# Patient Record
Sex: MALE | Race: OTHER | HISPANIC OR LATINO
[De-identification: names, ages, dates, MRNs, and addresses within clinical notes are randomized per-mention and may not be internally consistent; named-entity substitution may affect disease eponyms.]

---

## 2020-03-03 PROBLEM — Z00.00 ENCOUNTER FOR PREVENTIVE HEALTH EXAMINATION: Status: ACTIVE | Noted: 2020-03-03

## 2020-03-06 ENCOUNTER — APPOINTMENT (OUTPATIENT)
Dept: RADIATION ONCOLOGY | Facility: CLINIC | Age: 85
End: 2020-03-06
Payer: MEDICARE

## 2020-03-06 VITALS
HEART RATE: 78 BPM | RESPIRATION RATE: 18 BRPM | OXYGEN SATURATION: 98 % | SYSTOLIC BLOOD PRESSURE: 123 MMHG | WEIGHT: 183 LBS | DIASTOLIC BLOOD PRESSURE: 67 MMHG

## 2020-03-06 DIAGNOSIS — Z78.9 OTHER SPECIFIED HEALTH STATUS: ICD-10-CM

## 2020-03-06 DIAGNOSIS — I10 ESSENTIAL (PRIMARY) HYPERTENSION: ICD-10-CM

## 2020-03-06 PROCEDURE — 99204 OFFICE O/P NEW MOD 45 MIN: CPT | Mod: 25

## 2020-03-06 RX ORDER — AMLODIPINE BESYLATE 5 MG/1
5 TABLET ORAL
Refills: 0 | Status: ACTIVE | COMMUNITY

## 2020-03-06 RX ORDER — VALSARTAN AND HYDROCHLOROTHIAZIDE 320; 25 MG/1; MG/1
320-25 TABLET, FILM COATED ORAL
Refills: 0 | Status: ACTIVE | COMMUNITY

## 2020-03-06 NOTE — VITALS
[Maximal Pain Intensity: 4/10] : 4/10 [Least Pain Intensity: 0/10] : 0/10 [Pain Duration: ___] : Pain duration: [unfilled] [Pain Location: ___] : Pain Location: [unfilled] [90: Able to carry normal activity; minor signs or symptoms of disease.] : 90: Able to carry normal activity; minor signs or symptoms of disease.  [Date: ____________] : Patient's last distress assessment performed on [unfilled]. [0 - No Distress] : Distress Level: 0

## 2020-03-30 ENCOUNTER — OUTPATIENT (OUTPATIENT)
Dept: OUTPATIENT SERVICES | Facility: HOSPITAL | Age: 85
LOS: 1 days | End: 2020-03-30
Payer: MEDICARE

## 2020-03-30 DIAGNOSIS — C46.0 KAPOSI'S SARCOMA OF SKIN: ICD-10-CM

## 2020-03-30 PROCEDURE — 88321 CONSLTJ&REPRT SLD PREP ELSWR: CPT

## 2020-05-06 NOTE — REVIEW OF SYSTEMS
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

## 2020-05-06 NOTE — REASON FOR VISIT
[Other: ___] : [unfilled] [Routine On-Treatment] : a routine on-treatment visit for [[employer] ] : provided by [employer]   [Other: _____] : [unfilled] [FreeTextEntry1] : [id number] [FreeTextEntry2] : Carina [TWNoteComboBox1] : [place]

## 2020-05-06 NOTE — HISTORY OF PRESENT ILLNESS
[FreeTextEntry1] : 5/6/20 - OTV - Mr. Oh completed 800 cGy / 1200 cGy to the right knee.  He offers no complaints.\par ________________________________________________\par ONCOLOGIC HISTORY (3/6/20)\par Mr. Jono Oh is an 88 year old male referred by Dr. 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 \par \par As per Dr. Larios's note, he presented with skin lesions mainly on right posterior knee and right foot that appeared around Summer 2019. Initially the lesions were observed, but when they enlarged, patient was sent for biopsy. Biopsy was done with PCP Dr. Robert Vale at [facility] and it was positive for Kaposi sarcoma (path report not available at this time).\par \par PET/ CT done 2/26/2020 at [facility] showed the following: \par -Right shoulder uptake, possibly related to bursitis or joint effusion. \par -Severe multilevel osteoarthritis. \par -No FDG avid region suggestive of neoplastic disease. \par -Colonic diverticulosis. \par -Enlarged prostate. \par \par He most recently followed up with Dr. Larios on 2/28/2020, at which time he was referred here for consideration of RT. As per patient's daughter, Dr. Larios preferred to hold off on systemic therapy due to patient's age, but may consider it in the future if RT was not successful.  He has not had any treatment thus far for the Kaposi sarcoma. \par \par Today, he reports he feels generally well with the exception of soreness in the shoulders and lower back pain that started a week ago. He notes the back pain sometimes interferes with his sleep. He states the lesions to his right posterior knee and foot are asymptomatic; denies pain or pruritus. Denies fever, chills, SOB, CP, NVDC, fatigue, or any further complaints. \par

## 2020-05-06 NOTE — PHYSICAL EXAM
[Cervical Lymph Nodes Enlarged Anterior Bilaterally] : anterior cervical [Cervical Lymph Nodes Enlarged Posterior Bilaterally] : posterior cervical [Supraclavicular Lymph Nodes Enlarged Bilaterally] : supraclavicular [Normal] : no focal deficits [de-identified] : No changes over the right knee.  The KS lesions are less prominent.  [de-identified] : Along the right leg, beginning on the foot and extending to the lower thigh are scattered purpural macules and nodules.  The most prominent are clustered on the psterior-medial aspect of the right knee.  there is no edmea.

## 2020-05-06 NOTE — DISEASE MANAGEMENT
[Clinical] : TNM Stage: c [N/A] : Currently not applicable [FreeTextEntry4] : Nodular Kaposi's sarcoma [TTNM] : . [NTNM] : . [MTNM] : . [de-identified] : 800 cGy [de-identified] : 1200 cGy [de-identified] : right knee

## 2020-05-20 NOTE — DISEASE MANAGEMENT
[Clinical] : TNM Stage: c [N/A] : Currently not applicable [FreeTextEntry4] : Nodular Kaposi's sarcoma [TTNM] : . [NTNM] : . [MTNM] : .

## 2020-05-20 NOTE — REVIEW OF SYSTEMS
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

## 2020-05-20 NOTE — REASON FOR VISIT
[Consideration of Curative Therapy] : consideration of curative therapy for [Other: ___] : [unfilled] [Other: _____] : [unfilled] [[employer] ] : provided by [employer]   [FreeTextEntry1] : [id number] [FreeTextEntry2] : Carina [TWNoteComboBox1] : [place]

## 2020-05-20 NOTE — HISTORY OF PRESENT ILLNESS
[FreeTextEntry1] : Mr. Jono Oh is an 88 year old male referred by Dr. 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 \par \par As per Dr. Larios's note, he presented with skin lesions mainly on right posterior knee and right foot that appeared around Summer 2019. Initially the lesions were observed, but when they enlarged, patient was sent for biopsy. Biopsy was done with PCP Dr. Robert Vale at [facility] and it was positive for Kaposi sarcoma (path report not available at this time).\par \par PET/ CT done 2/26/2020 at [facility] showed the following: \par -Right shoulder uptake, possibly related to bursitis or joint effusion. \par -Severe multilevel osteoarthritis. \par -No FDG avid region suggestive of neoplastic disease. \par -Colonic diverticulosis. \par -Enlarged prostate. \par \par He most recently followed up with Dr. Larios on 2/28/2020, at which time he was referred here for consideration of RT. As per patient's daughter, Dr. Larios preferred to hold off on systemic therapy due to patient's age, but may consider it in the future if RT was not successful.  He has not had any treatment thus far for the Kaposi sarcoma. \par \par Today, he reports he feels generally well with the exception of soreness in the shoulders and lower back pain that started a week ago. He notes the back pain sometimes interferes with his sleep. He states the lesions to his right posterior knee and foot are asymptomatic; denies pain or pruritus. Denies fever, chills, SOB, CP, NVDC, fatigue, or any further complaints. \par

## 2020-05-20 NOTE — PHYSICAL EXAM
[Cervical Lymph Nodes Enlarged Posterior Bilaterally] : posterior cervical [Cervical Lymph Nodes Enlarged Anterior Bilaterally] : anterior cervical [Supraclavicular Lymph Nodes Enlarged Bilaterally] : supraclavicular [Normal] : oriented to person, place and time, the affect was normal, the mood was normal and not anxious [de-identified] : Along the right leg, beginning on the foot and extending to the lower thigh are scattered purpural macules and nodules.  The most prominent are clustered on the psterior-medial aspect of the right knee.  there is no edmea.

## 2020-06-09 NOTE — VITALS
[Maximal Pain Intensity: 4/10] : 4/10 [Least Pain Intensity: 0/10] : 0/10 [Pain Duration: ___] : Pain duration: [unfilled] [Pain Location: ___] : Pain Location: [unfilled] [90: Able to carry normal activity; minor signs or symptoms of disease.] : 90: Able to carry normal activity; minor signs or symptoms of disease.

## 2020-06-10 ENCOUNTER — APPOINTMENT (OUTPATIENT)
Dept: RADIATION ONCOLOGY | Facility: CLINIC | Age: 85
End: 2020-06-10
Payer: MEDICARE

## 2020-06-10 PROCEDURE — 99024 POSTOP FOLLOW-UP VISIT: CPT

## 2020-06-10 NOTE — PHYSICAL EXAM
[Cervical Lymph Nodes Enlarged Posterior Bilaterally] : posterior cervical [Supraclavicular Lymph Nodes Enlarged Bilaterally] : supraclavicular [Cervical Lymph Nodes Enlarged Anterior Bilaterally] : anterior cervical [Normal] : oriented to person, place and time, the affect was normal, the mood was normal and not anxious [de-identified] : No changes over the right knee.  The KS lesions are less prominent.  There is no induration of the skin.   [de-identified] : Along the right leg, beginning on the foot and extending to the lower thigh are scattered purpural macules and nodules.  The most prominent are clustered on the posterior-medial aspect of the right knee.  there is no edema.

## 2020-06-10 NOTE — REVIEW OF SYSTEMS
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 [FreeTextEntry9] : lower back pain, shoulder soreness

## 2020-06-10 NOTE — REASON FOR VISIT
[Post-Treatment Evaluation] : post-treatment evaluation for [Other: ___] : [unfilled] [Other: _____] : [unfilled] [[employer] ] : provided by [employer]   [FreeTextEntry1] : [id number] [FreeTextEntry2] : Carina [TWNoteComboBox1] : [place]

## 2020-06-10 NOTE — DISEASE MANAGEMENT
[Clinical] : TNM Stage: c [N/A] : Currently not applicable [FreeTextEntry4] : Nodular Kaposi's sarcoma [MTNM] : . [NTNM] : . [TTNM] : . [de-identified] : right knee

## 2020-06-10 NOTE — HISTORY OF PRESENT ILLNESS
[FreeTextEntry1] : Mr. Jono Oh has completed palliative radiation therapy to the RIGHT knee for a total of 1200 cGy over 3 fractions from 5/4/2020 to 5/8/2020 for Kaposi's sarcoma. He tolerated his radiation well and did not develop any grade 3 or higher acute toxicities as a result of treatment. \par \par 6/10/2020- PTE\par Mr. Oh returns for post treatment evaluation.  Overall is doing well, reporting that the discomfort he had at the right knee as resolved. He denies he had any issues with treatment. \par \par ________________________________________________\par ONCOLOGIC HISTORY (3/6/20)\par Mr. Jono Oh is an 88 year old male referred by Dr. 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 \par \par As per Dr. Larios's note, he presented with skin lesions mainly on right posterior knee and right foot that appeared around Summer 2019. Initially the lesions were observed, but when they enlarged, patient was sent for biopsy. Biopsy was done with PCP Dr. Robert Vale at [facility] and it was positive for Kaposi sarcoma (path report not available at this time).\par \par PET/ CT done 2/26/2020 at [facility] showed the following: \par -Right shoulder uptake, possibly related to bursitis or joint effusion. \par -Severe multilevel osteoarthritis. \par -No FDG avid region suggestive of neoplastic disease. \par -Colonic diverticulosis. \par -Enlarged prostate. \par \par He most recently followed up with Dr. Larios on 2/28/2020, at which time he was referred here for consideration of RT. As per patient's daughter, Dr. Larios preferred to hold off on systemic therapy due to patient's age, but may consider it in the future if RT was not successful.  He has not had any treatment thus far for the Kaposi sarcoma. \par \par Today, he reports he feels generally well with the exception of soreness in the shoulders and lower back pain that started a week ago. He notes the back pain sometimes interferes with his sleep. He states the lesions to his right posterior knee and foot are asymptomatic; denies pain or pruritus. Denies fever, chills, SOB, CP, NVDC, fatigue, or any further complaints. \par

## 2022-02-09 ENCOUNTER — APPOINTMENT (OUTPATIENT)
Dept: RADIATION ONCOLOGY | Facility: CLINIC | Age: 87
End: 2022-02-09
Payer: MEDICARE

## 2022-02-09 VITALS
DIASTOLIC BLOOD PRESSURE: 83 MMHG | TEMPERATURE: 98.3 F | SYSTOLIC BLOOD PRESSURE: 127 MMHG | HEIGHT: 65 IN | WEIGHT: 178 LBS | HEART RATE: 96 BPM | BODY MASS INDEX: 29.66 KG/M2 | OXYGEN SATURATION: 100 %

## 2022-02-09 PROCEDURE — 99213 OFFICE O/P EST LOW 20 MIN: CPT

## 2022-02-09 NOTE — PHYSICAL EXAM
[Cervical Lymph Nodes Enlarged Posterior Bilaterally] : posterior cervical [Cervical Lymph Nodes Enlarged Anterior Bilaterally] : anterior cervical [Supraclavicular Lymph Nodes Enlarged Bilaterally] : supraclavicular [Normal] : oriented to person, place and time, the affect was normal, the mood was normal and not anxious [de-identified] : No changes over the right knee.  The KS lesions are less prominent.  There is no induration of the skin.   [de-identified] : Again noted on the right leg are areas of purpural discoloration.  Along the lateral aspect of the right knee are several purpural nodules which are painful to her.  Also along the medial aspect of the right foot are several painful nodules, all consistent with KS.

## 2022-02-09 NOTE — DISEASE MANAGEMENT
[Clinical] : TNM Stage: c [N/A] : Currently not applicable [FreeTextEntry4] : Nodular Kaposi's sarcoma [TTNM] : . [NTNM] : . [MTNM] : . [de-identified] : right knee

## 2022-02-09 NOTE — HISTORY OF PRESENT ILLNESS
[FreeTextEntry1] :  REQUIRED, DAUGHTER:CLAU WARNER DOES NOT SPEAK ENGLISH EITHER.\par Mr. 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 He tolerated his radiation well and did not develop any grade 3 or higher acute toxicities as a result of treatment. \par Patient has now developed Kaposi sarcoma on RIGHT LATERAL KNEE AND RIGHT INNER FOOT.\par \par 02/08/2022- Follow Up\par  # [id number]. Patient is here accompanied by his daughter Clau. The patient is known to us from a previous treatment for KS in 2020.  Patient has spots denoting Kaposi Sarcoma to right lateral knee and right inner foot. Patient states he has mild pain on right foot. CT-Sim and MRI (ordered by PCP) scheduled for next week. \par \par 6/10/2020- PTE\par Mr. Warner returns for post treatment evaluation.  Overall is doing well, reporting that the discomfort he had at the right knee as resolved. He denies he had any issues with treatment. \par \par ________________________________________________\par ONCOLOGIC HISTORY (3/6/20)\par Mr. Jono Warner is an 88 year old male referred by Dr. 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 \par \par As per Dr. Larios's note, he presented with skin lesions mainly on right posterior knee and right foot that appeared around Summer 2019. Initially the lesions were observed, but when they enlarged, patient was sent for biopsy. Biopsy was done with PCP Dr. Robert Vale at [facility] and it was positive for Kaposi sarcoma (path report not available at this time).\par \par PET/ CT done 2/26/2020 at [facility] showed the following: \par -Right shoulder uptake, possibly related to bursitis or joint effusion. \par -Severe multilevel osteoarthritis. \par -No FDG avid region suggestive of neoplastic disease. \par -Colonic diverticulosis. \par -Enlarged prostate. \par \par He most recently followed up with Dr. Larios on 2/28/2020, at which time he was referred here for consideration of RT. As per patient's daughter, Dr. Larios preferred to hold off on systemic therapy due to patient's age, but may consider it in the future if RT was not successful.  He has not had any treatment thus far for the Kaposi sarcoma. \par \par Today, he reports he feels generally well with the exception of soreness in the shoulders and lower back pain that started a week ago. He notes the back pain sometimes interferes with his sleep. He states the lesions to his right posterior knee and foot are asymptomatic; denies pain or pruritus. Denies fever, chills, SOB, CP, NVDC, fatigue, or any further complaints. \par

## 2022-02-09 NOTE — REVIEW OF SYSTEMS
[Patient Intake Form Reviewed] : Patient intake form was reviewed [Negative] : Allergic/Immunologic [Edema Limbs: Grade 0] : Edema Limbs: Grade 0  [Fatigue: Grade 0] : Fatigue: Grade 0 [Localized Edema: Grade 0] : Localized Edema: Grade 0  [Neck Edema: Grade 0] : Neck Edema: Grade 0 [Cough: Grade 0] : Cough: Grade 0 [Dyspnea: Grade 0] : Dyspnea: Grade 0 [Hiccups: Grade 0] : Hiccups: Grade 0 [Hoarseness: Grade 0] : Hoarseness: Grade 0 [Alopecia: Grade 0] : Alopecia: Grade 0 [Pruritus: Grade 0] : Pruritus: Grade 0 [Skin Atrophy: Grade 0] : Skin Atrophy: Grade 0 [Skin Hyperpigmentation: Grade 0] : Skin Hyperpigmentation: Grade 0 [Skin Induration: Grade 0] : Skin Induration: Grade 0 [Dermatitis Radiation: Grade 0] : Dermatitis Radiation: Grade 0 [Fever] : no fever [Chills] : no chills [Night Sweats] : no night sweats [Fatigue] : no fatigue [Recent Change In Weight] : ~T no recent weight change [FreeTextEntry9] : lower back pain, shoulder soreness [de-identified] : Kaposi sarcoma- 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

## 2022-02-09 NOTE — VITALS
[Maximal Pain Intensity: 2/10] : 2/10 [Least Pain Intensity: 2/10] : 2/10 [NoTreatment Scheduled] : no treatment scheduled [90: Able to carry normal activity; minor signs or symptoms of disease.] : 90: Able to carry normal activity; minor signs or symptoms of disease.  [ECOG Performance Status: 1 - Restricted in physically strenuous activity but ambulatory and able to carry out work of a light or sedentary nature] : Performance Status: 1 - Restricted in physically strenuous activity but ambulatory and able to carry out work of a light or sedentary nature, e.g., light house work, office work

## 2022-02-16 ENCOUNTER — RESULT REVIEW (OUTPATIENT)
Age: 87
End: 2022-02-16

## 2022-02-16 ENCOUNTER — OUTPATIENT (OUTPATIENT)
Dept: OUTPATIENT SERVICES | Facility: HOSPITAL | Age: 87
LOS: 1 days | End: 2022-02-16
Payer: MEDICARE

## 2022-02-16 ENCOUNTER — APPOINTMENT (OUTPATIENT)
Dept: MRI IMAGING | Facility: HOSPITAL | Age: 87
End: 2022-02-16

## 2022-02-16 PROCEDURE — 73723 MRI JOINT LWR EXTR W/O&W/DYE: CPT | Mod: 26,RT

## 2022-02-17 ENCOUNTER — RESULT REVIEW (OUTPATIENT)
Age: 87
End: 2022-02-17

## 2022-02-17 ENCOUNTER — OUTPATIENT (OUTPATIENT)
Dept: OUTPATIENT SERVICES | Facility: HOSPITAL | Age: 87
LOS: 1 days | End: 2022-02-17
Payer: MEDICARE

## 2022-02-17 LAB — GLUCOSE BLDC GLUCOMTR-MCNC: 105 MG/DL — HIGH (ref 70–99)

## 2022-02-17 PROCEDURE — A9552: CPT

## 2022-02-17 PROCEDURE — 73723 MRI JOINT LWR EXTR W/O&W/DYE: CPT

## 2022-02-17 PROCEDURE — 78816 PET IMAGE W/CT FULL BODY: CPT | Mod: 26

## 2022-02-17 PROCEDURE — 78816 PET IMAGE W/CT FULL BODY: CPT

## 2022-02-17 PROCEDURE — 82962 GLUCOSE BLOOD TEST: CPT

## 2022-02-17 PROCEDURE — A9585: CPT

## 2022-04-04 ENCOUNTER — NON-APPOINTMENT (OUTPATIENT)
Age: 87
End: 2022-04-04

## 2022-04-04 VITALS
TEMPERATURE: 98 F | DIASTOLIC BLOOD PRESSURE: 77 MMHG | HEIGHT: 65 IN | RESPIRATION RATE: 16 BRPM | WEIGHT: 181 LBS | OXYGEN SATURATION: 98 % | HEART RATE: 86 BPM | BODY MASS INDEX: 30.16 KG/M2 | SYSTOLIC BLOOD PRESSURE: 129 MMHG

## 2022-05-09 ENCOUNTER — APPOINTMENT (OUTPATIENT)
Dept: RADIATION ONCOLOGY | Facility: CLINIC | Age: 87
End: 2022-05-09
Payer: MEDICARE

## 2022-05-09 VITALS
DIASTOLIC BLOOD PRESSURE: 74 MMHG | TEMPERATURE: 98.1 F | WEIGHT: 181 LBS | RESPIRATION RATE: 18 BRPM | HEART RATE: 101 BPM | HEIGHT: 65 IN | BODY MASS INDEX: 30.16 KG/M2 | SYSTOLIC BLOOD PRESSURE: 131 MMHG | OXYGEN SATURATION: 98 %

## 2022-05-09 DIAGNOSIS — C46.9 KAPOSI'S SARCOMA, UNSPECIFIED: ICD-10-CM

## 2022-05-09 PROCEDURE — 99024 POSTOP FOLLOW-UP VISIT: CPT

## 2022-05-09 NOTE — HISTORY OF PRESENT ILLNESS
[FreeTextEntry1] :  REQUIRED, DAUGHTER:CLAU WARNER DOES NOT SPEAK ENGLISH EITHER.\par \par \par 05/09/2022: PTE:   Mr. 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 He tolerated his radiation well and did not develop any grade 3 or higher acute toxicities as a result of treatment. \par \par Patient has now developed Kaposi sarcoma on RIGHT LATERAL KNEE AND RIGHT INNER FOOT. He has completed 3D conformal 3Fx or 1200 cGy each to right lower leg and right inner foot from 03/20/2022 to 04/4/2022.\par \par \par \par \par 04/04/2022: FIRST AND FINAL OTV:  # [id number].Patient is here accompanied by his daughter Clau.  has completed 3/3 Fx or 1200/1200 cGy to Kaposi's sarcoma of skin. No redness noted at treatment site.Encouraged to use Aquaphor to treatment site.  He notes a couple of nodules on the toes of the right foot, but he denies any pain or discomfort. \par \par _________________________________________________________________________________\par \par 02/08/2022- Follow Up\par  # [id number]. Patient is here accompanied by his daughter Clau. The patient is known to us from a previous treatment for KS in 2020.  Patient has spots denoting Kaposi Sarcoma to right lateral knee and right inner foot. Patient states he has mild pain on right foot. CT-Sim and MRI (ordered by PCP) scheduled for next week. \par \par 6/10/2020- PTE\par Mr. Warner returns for post treatment evaluation.  Overall is doing well, reporting that the discomfort he had at the right knee as resolved. He denies he had any issues with treatment. \par \par ________________________________________________\par ONCOLOGIC HISTORY (3/6/20)\par Mr. Jono Warner is an 88 year old male referred by Dr. 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 \par \par As per Dr. Larios's note, he presented with skin lesions mainly on right posterior knee and right foot that appeared around Summer 2019. Initially the lesions were observed, but when they enlarged, patient was sent for biopsy. Biopsy was done with PCP Dr. Robert Vale at [facility] and it was positive for Kaposi sarcoma (path report not available at this time).\par \par PET/ CT done 2/26/2020 at [facility] showed the following: \par -Right shoulder uptake, possibly related to bursitis or joint effusion. \par -Severe multilevel osteoarthritis. \par -No FDG avid region suggestive of neoplastic disease. \par -Colonic diverticulosis. \par -Enlarged prostate. \par \par He most recently followed up with Dr. Larios on 2/28/2020, at which time he was referred here for consideration of RT. As per patient's daughter, Dr. Larios preferred to hold off on systemic therapy due to patient's age, but may consider it in the future if RT was not successful.  He has not had any treatment thus far for the Kaposi sarcoma. \par \par Today, he reports he feels generally well with the exception of soreness in the shoulders and lower back pain that started a week ago. He notes the back pain sometimes interferes with his sleep. He states the lesions to his right posterior knee and foot are asymptomatic; denies pain or pruritus. Denies fever, chills, SOB, CP, NVDC, fatigue, or any further complaints. \par

## 2022-05-09 NOTE — REVIEW OF SYSTEMS
[Patient Intake Form Reviewed] : Patient intake form was reviewed [Negative] : Allergic/Immunologic [Edema Limbs: Grade 0] : Edema Limbs: Grade 0  [Fatigue: Grade 0] : Fatigue: Grade 0 [Localized Edema: Grade 0] : Localized Edema: Grade 0  [Neck Edema: Grade 0] : Neck Edema: Grade 0 [Cough: Grade 0] : Cough: Grade 0 [Dyspnea: Grade 0] : Dyspnea: Grade 0 [Hiccups: Grade 0] : Hiccups: Grade 0 [Hoarseness: Grade 0] : Hoarseness: Grade 0 [Alopecia: Grade 0] : Alopecia: Grade 0 [Pruritus: Grade 0] : Pruritus: Grade 0 [Skin Hyperpigmentation: Grade 0] : Skin Hyperpigmentation: Grade 0 [Skin Atrophy: Grade 0] : Skin Atrophy: Grade 0 [Skin Induration: Grade 0] : Skin Induration: Grade 0 [Dermatitis Radiation: Grade 0] : Dermatitis Radiation: Grade 0 [Fever] : no fever [Chills] : no chills [Night Sweats] : no night sweats [Fatigue] : no fatigue [Recent Change In Weight] : ~T no recent weight change [FreeTextEntry9] : lower back pain, shoulder soreness [de-identified] : Kaposi sarcoma- 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

## 2022-05-09 NOTE — PHYSICAL EXAM
[Cervical Lymph Nodes Enlarged Posterior Bilaterally] : posterior cervical [Cervical Lymph Nodes Enlarged Anterior Bilaterally] : anterior cervical [Supraclavicular Lymph Nodes Enlarged Bilaterally] : supraclavicular [Normal] : oriented to person, place and time, the affect was normal, the mood was normal and not anxious [de-identified] :  The KS lesions continue to shrink.   [de-identified] : Again noted on the right leg are areas of purpural discoloration.  Along the lateral aspect of the right knee are several purpural nodules which are painful to her.  Also along the medial aspect of the right foot are several painful nodules, all consistent with KS.

## 2022-05-09 NOTE — PHYSICAL EXAM
[Cervical Lymph Nodes Enlarged Posterior Bilaterally] : posterior cervical [Cervical Lymph Nodes Enlarged Anterior Bilaterally] : anterior cervical [Supraclavicular Lymph Nodes Enlarged Bilaterally] : supraclavicular [Normal] : no joint swelling, no clubbing or cyanosis of the fingernails and muscle strength and tone were normal [de-identified] : No changes over the right knee.  The KS lesions are less prominent.  There is no induration of the skin.   [de-identified] : The treated leg looks much clearer, with few KS lesions noted and most are flat, ie not as yet nodular.

## 2022-05-09 NOTE — DISEASE MANAGEMENT
[Clinical] : TNM Stage: c [N/A] : Currently not applicable [FreeTextEntry4] : Nodular Kaposi's sarcoma [TTNM] : . [NTNM] : . [MTNM] : . [de-identified] : 1200 [de-identified] : 1200 [de-identified] : Right lower leg and Right inner foot

## 2022-05-09 NOTE — HISTORY OF PRESENT ILLNESS
[FreeTextEntry1] :  REQUIRED, DAUGHTER:CLAU WARNER DOES NOT SPEAK ENGLISH EITHER.\par Mr. 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 He tolerated his radiation well and did not develop any grade 3 or higher acute toxicities as a result of treatment. 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  has completed 3/3 Fx or 1200/1200 cGy to Kaposi's sarcoma of skin. No redness noted at treatment site.Encouraged to use Aquaphor to treatment site.  He notes a couple of nodules on the toes of the right foot, but he denies any pain or discomfort. \par \par _________________________________________________________________________________\par \par 02/08/2022- Follow Up\par  # [id number]. Patient is here accompanied by his daughter Clau. The patient is known to us from a previous treatment for KS in 2020.  Patient has spots denoting Kaposi Sarcoma to right lateral knee and right inner foot. Patient states he has mild pain on right foot. CT-Sim and MRI (ordered by PCP) scheduled for next week. \par \par 6/10/2020- PTE\par Mr. Warner returns for post treatment evaluation.  Overall is doing well, reporting that the discomfort he had at the right knee as resolved. He denies he had any issues with treatment. \par \par ________________________________________________\par ONCOLOGIC HISTORY (3/6/20)\par Mr. Jono Warner is an 88 year old male referred by Dr. 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 \par \par As per Dr. Larios's note, he presented with skin lesions mainly on right posterior knee and right foot that appeared around Summer 2019. Initially the lesions were observed, but when they enlarged, patient was sent for biopsy. Biopsy was done with PCP Dr. Robret Vale at [facility] and it was positive for Kaposi sarcoma (path report not available at this time).\par \par PET/ CT done 2/26/2020 at [facility] showed the following: \par -Right shoulder uptake, possibly related to bursitis or joint effusion. \par -Severe multilevel osteoarthritis. \par -No FDG avid region suggestive of neoplastic disease. \par -Colonic diverticulosis. \par -Enlarged prostate. \par \par He most recently followed up with Dr. Larios on 2/28/2020, at which time he was referred here for consideration of RT. As per patient's daughter, Dr. Larios preferred to hold off on systemic therapy due to patient's age, but may consider it in the future if RT was not successful.  He has not had any treatment thus far for the Kaposi sarcoma. \par \par Today, he reports he feels generally well with the exception of soreness in the shoulders and lower back pain that started a week ago. He notes the back pain sometimes interferes with his sleep. He states the lesions to his right posterior knee and foot are asymptomatic; denies pain or pruritus. Denies fever, chills, SOB, CP, NVDC, fatigue, or any further complaints. \par

## 2022-05-09 NOTE — DISEASE MANAGEMENT
[Clinical] : TNM Stage: c [N/A] : Currently not applicable [FreeTextEntry4] : Nodular Kaposi's sarcoma [TTNM] : . [NTNM] : . [MTNM] : . [de-identified] : 1200 [de-identified] : 1200 [de-identified] : Right lower leg and Right inner foot

## 2022-05-09 NOTE — REVIEW OF SYSTEMS
[Edema Limbs: Grade 0] : Edema Limbs: Grade 0  [Localized Edema: Grade 0] : Localized Edema: Grade 0  [Fatigue: Grade 0] : Fatigue: Grade 0 [Neck Edema: Grade 0] : Neck Edema: Grade 0 [Cough: Grade 0] : Cough: Grade 0 [Dyspnea: Grade 0] : Dyspnea: Grade 0 [Hiccups: Grade 0] : Hiccups: Grade 0 [Hoarseness: Grade 0] : Hoarseness: Grade 0 [Alopecia: Grade 0] : Alopecia: Grade 0 [Pruritus: Grade 0] : Pruritus: Grade 0 [Skin Atrophy: Grade 0] : Skin Atrophy: Grade 0 [Skin Hyperpigmentation: Grade 0] : Skin Hyperpigmentation: Grade 0 [Skin Induration: Grade 0] : Skin Induration: Grade 0 [Dermatitis Radiation: Grade 0] : Dermatitis Radiation: Grade 0 [Patient Intake Form Reviewed] : Patient intake form was reviewed [Negative] : Allergic/Immunologic [Fever] : no fever [Chills] : no chills [Night Sweats] : no night sweats [Fatigue] : no fatigue [Recent Change In Weight] : ~T no recent weight change [FreeTextEntry9] : lower back pain, shoulder soreness [de-identified] : Kaposi sarcoma- 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

## 2022-11-22 ENCOUNTER — APPOINTMENT (OUTPATIENT)
Dept: RADIATION ONCOLOGY | Facility: CLINIC | Age: 87
End: 2022-11-22
Payer: MEDICARE

## 2022-11-23 ENCOUNTER — APPOINTMENT (OUTPATIENT)
Dept: RADIATION ONCOLOGY | Facility: CLINIC | Age: 87
End: 2022-11-23
Payer: MEDICARE

## 2022-11-23 VITALS
HEIGHT: 65 IN | WEIGHT: 180 LBS | SYSTOLIC BLOOD PRESSURE: 113 MMHG | RESPIRATION RATE: 16 BRPM | HEART RATE: 81 BPM | BODY MASS INDEX: 29.99 KG/M2 | OXYGEN SATURATION: 98 % | DIASTOLIC BLOOD PRESSURE: 68 MMHG | TEMPERATURE: 98.2 F

## 2022-11-23 PROCEDURE — 99215 OFFICE O/P EST HI 40 MIN: CPT

## 2022-11-28 NOTE — HISTORY OF PRESENT ILLNESS
[FreeTextEntry1] :  REQUIRED, DAUGHTER:CLAU OH DOES NOT SPEAK ENGLISH EITHER.\par \par \par 11/23/22 RPA:\par Pt presents for follow-up.  # [id number]. Patient is here with his daughter. Skin over radiation treatment area on lateral aspect of knee and right inner foot  is clean, dry and intact. There are 3 hyperpigmented area to lateral aspect of knee at previously treated area. Also, there are 3 new lesions noted on medial aspect of right heel.  Denies any pain at treatment site, fatigue or difficulty moving his knee.On systemic therapy with Dr. Cottrell. Next appointment with  is on 12/01/2022. If the lesion on the heel turns out positive for Kaposi sarcoma, patient will be treated. patient and daughter made aware of the plan and consent signed.\par \par 05/09/2022: PTE \par Mr. 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 He tolerated his radiation well and did not develop any grade 3 or higher acute toxicities as a result of treatment. 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 He has completed 3D conformal 3Fx or 1200 cGy each to right lower leg and right inner foot from 03/20/2022 to 04/4/2022.\par \par \par 04/04/2022: FINAL OTV  # [id number]\par Patient is here accompanied by his daughter Clau.  has completed 3/3 Fx or 1200/1200 cGy to Kaposi's sarcoma of skin. No redness noted at treatment site.Encouraged to use Aquaphor to treatment site.  He notes a couple of nodules on the toes of the right foot, but he denies any pain or discomfort. \par \par \par 02/08/2022: Follow Up\par  # [id number]. Patient is here accompanied by his daughter Clau. The patient is known to us from a previous treatment for KS in 2020.  Patient has spots denoting Kaposi Sarcoma to right lateral knee and right inner foot. Patient states he has mild pain on right foot. CT-Sim and MRI (ordered by PCP) scheduled for next week. \par \par \par 6/10/2020: PTE\par Mr. Oh returns for post treatment evaluation.  Overall is doing well, reporting that the discomfort he had at the right knee as resolved. He denies he had any issues with treatment. \par \par ________________________________________________\par ONCOLOGIC HISTORY (3/6/20)\par Mr. Jono Oh is an 88 year old male referred by Dr. 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 \par \par As per Dr. Larios's note, he presented with skin lesions mainly on right posterior knee and right foot that appeared around Summer 2019. Initially the lesions were observed, but when they enlarged, patient was sent for biopsy. Biopsy was done with PCP Dr. Robert Vale at [facility] and it was positive for Kaposi sarcoma (path report not available at this time).\par \par PET/ CT done 2/26/2020 at [facility] showed the following: \par -Right shoulder uptake, possibly related to bursitis or joint effusion. \par -Severe multilevel osteoarthritis. \par -No FDG avid region suggestive of neoplastic disease. \par -Colonic diverticulosis. \par -Enlarged prostate. \par \par He most recently followed up with Dr. Larios on 2/28/2020, at which time he was referred here for consideration of RT. As per patient's daughter, Dr. Larios preferred to hold off on systemic therapy due to patient's age, but may consider it in the future if RT was not successful.  He has not had any treatment thus far for the Kaposi sarcoma. \par \par Today, he reports he feels generally well with the exception of soreness in the shoulders and lower back pain that started a week ago. He notes the back pain sometimes interferes with his sleep. He states the lesions to his right posterior knee and foot are asymptomatic; denies pain or pruritus. Denies fever, chills, SOB, CP, NVDC, fatigue, or any further complaints. \par

## 2022-11-28 NOTE — ASSESSMENT
[Regional recurrence] : Regional recurrence [FreeTextEntry1] : suspected regional recurrence in the right heel

## 2022-11-28 NOTE — REVIEW OF SYSTEMS
[Joint Pain] : joint pain [Negative] : Psychiatric [Edema Limbs: Grade 0] : Edema Limbs: Grade 0  [Fatigue: Grade 0] : Fatigue: Grade 0 [Localized Edema: Grade 0] : Localized Edema: Grade 0  [Neck Edema: Grade 0] : Neck Edema: Grade 0 [Peripheral Motor Neuropathy: Grade 0] : Peripheral Motor Neuropathy: Grade 0 [Peripheral Sensory Neuropathy: Grade 0] : Peripheral Sensory Neuropathy: Grade 0 [Alopecia: Grade 0] : Alopecia: Grade 0 [Pruritus: Grade 0] : Pruritus: Grade 0 [Skin Atrophy: Grade 0] : Skin Atrophy: Grade 0 [Skin Induration: Grade 0] : Skin Induration: Grade 0 [Dermatitis Radiation: Grade 0] : Dermatitis Radiation: Grade 0 [Skin Hyperpigmentation: Grade 1 - Hyperpigmentation covering <10% BSA; no psychosocial impact] : Skin Hyperpigmentation: Grade 1 - Hyperpigmentation covering <10% BSA; no psychosocial impact [Muscle Pain] : no muscle pain [Muscle Weakness] : no muscle weakness [FreeTextEntry9] : Osteoarthritis on right knee

## 2022-11-28 NOTE — DISEASE MANAGEMENT
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 [NTNM] : . [MTNM] : . [de-identified] : 1200 [de-identified] : 1200 [de-identified] : Right lower leg and Right inner foot

## 2022-11-28 NOTE — PHYSICAL EXAM
[Exaggerated Use Of Accessory Muscles For Inspiration] : no accessory muscle use [Musculoskeletal - Swelling] : no joint swelling [Range of Motion to Joints] : range of motion to joints [Motor Tone] : muscle strength and tone were normal [Skin Color & Pigmentation] : normal skin color and pigmentation [] : no rash [Skin Lesions] : no skin lesions [Normal] : no focal deficits [Oriented To Time, Place, And Person] : oriented to person, place, and time [de-identified] : patient has three round, dark red, firm, painless, raised lumps on the medial side of his right heel which are suspicious for recurrent Kaposi sarcoma

## 2022-12-05 ENCOUNTER — FORM ENCOUNTER (OUTPATIENT)
Age: 87
End: 2022-12-05

## 2022-12-15 ENCOUNTER — NON-APPOINTMENT (OUTPATIENT)
Age: 87
End: 2022-12-15

## 2022-12-15 NOTE — REASON FOR VISIT
[Routine On-Treatment] : a routine on-treatment visit for [Other: ___] : [unfilled] [Other: _____] : [unfilled] [Home] : at home, [unfilled] , at the time of the visit. [Medical Office: ([facility])___] : at the medical office located in  [Patient Declined  Services] : - None: Patient declined  services [Verbal consent obtained from patient] : the patient, [unfilled] [FreeTextEntry3] : Pt ok communicating with staff fluent in Telugu  [TWNoteComboBox1] : [place]ian

## 2022-12-15 NOTE — HISTORY OF PRESENT ILLNESS
[FreeTextEntry1] :  REQUIRED, DAUGHTER:CLAU OH DOES NOT SPEAK ENGLISH EITHER.\par \par 12/15/22 OTV\par Pt presents for OTV over telephone. Completed 800cGy/1200cGy to R medial heel. Notes feeling well overall. No complaints. PTE date confirmed with pt and understanding received. \par \par 11/23/22 RPA:\par Pt presents for follow-up.  # [id number]. Patient is here with his daughter. Skin over radiation treatment area on lateral aspect of knee and right inner foot  is clean, dry and intact. There are 3 hyperpigmented area to lateral aspect of knee at previously treated area. Also, there are 3 new lesions noted on medial aspect of right heel.  Denies any pain at treatment site, fatigue or difficulty moving his knee.On systemic therapy with Dr. Cottrell. Next appointment with  is on 12/01/2022. If the lesion on the heel turns out positive for Kaposi sarcoma, patient will be treated. patient and daughter made aware of the plan and consent signed.\par \par 05/09/2022: PTE \par Mr. 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 He tolerated his radiation well and did not develop any grade 3 or higher acute toxicities as a result of treatment. 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 He has completed 3D conformal 3Fx or 1200 cGy each to right lower leg and right inner foot from 03/20/2022 to 04/4/2022.\par \par \par 04/04/2022: FINAL OTV  # [id number]\par Patient is here accompanied by his daughter Clau.  has completed 3/3 Fx or 1200/1200 cGy to Kaposi's sarcoma of skin. No redness noted at treatment site.Encouraged to use Aquaphor to treatment site.  He notes a couple of nodules on the toes of the right foot, but he denies any pain or discomfort. \par \par \par 02/08/2022: Follow Up\par  # [id number]. Patient is here accompanied by his daughter Clau. The patient is known to us from a previous treatment for KS in 2020.  Patient has spots denoting Kaposi Sarcoma to right lateral knee and right inner foot. Patient states he has mild pain on right foot. CT-Sim and MRI (ordered by PCP) scheduled for next week. \par \par \par 6/10/2020: PTE\par Mr. Oh returns for post treatment evaluation.  Overall is doing well, reporting that the discomfort he had at the right knee as resolved. He denies he had any issues with treatment. \par \par ________________________________________________\par ONCOLOGIC HISTORY (3/6/20)\par Mr. Jono Oh is an 88 year old male referred by Dr. 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 \par \par As per Dr. Larios's note, he presented with skin lesions mainly on right posterior knee and right foot that appeared around Summer 2019. Initially the lesions were observed, but when they enlarged, patient was sent for biopsy. Biopsy was done with PCP Dr. Robert Vale at [facility] and it was positive for Kaposi sarcoma (path report not available at this time).\par \par PET/ CT done 2/26/2020 at [facility] showed the following: \par -Right shoulder uptake, possibly related to bursitis or joint effusion. \par -Severe multilevel osteoarthritis. \par -No FDG avid region suggestive of neoplastic disease. \par -Colonic diverticulosis. \par -Enlarged prostate. \par \par He most recently followed up with Dr. Larios on 2/28/2020, at which time he was referred here for consideration of RT. As per patient's daughter, Dr. Larios preferred to hold off on systemic therapy due to patient's age, but may consider it in the future if RT was not successful.  He has not had any treatment thus far for the Kaposi sarcoma. \par \par Today, he reports he feels generally well with the exception of soreness in the shoulders and lower back pain that started a week ago. He notes the back pain sometimes interferes with his sleep. He states the lesions to his right posterior knee and foot are asymptomatic; denies pain or pruritus. Denies fever, chills, SOB, CP, NVDC, fatigue, or any further complaints. \par

## 2022-12-15 NOTE — DISEASE MANAGEMENT
[Clinical] : TNM Stage: c [N/A] : Currently not applicable [FreeTextEntry4] : Nodular Kaposi's sarcoma [TTNM] : . [NTNM] : . [MTNM] : . [de-identified] : 469 [de-identified] : 1200 [de-identified] : Right medial heel

## 2022-12-15 NOTE — REVIEW OF SYSTEMS
[Fatigue: Grade 0] : Fatigue: Grade 0 [Alopecia: Grade 0] : Alopecia: Grade 0 [Pruritus: Grade 0] : Pruritus: Grade 0 [Skin Atrophy: Grade 0] : Skin Atrophy: Grade 0 [Skin Hyperpigmentation: Grade 0] : Skin Hyperpigmentation: Grade 0 [Skin Induration: Grade 0] : Skin Induration: Grade 0 [Dermatitis Radiation: Grade 0] : Dermatitis Radiation: Grade 0

## 2023-01-27 ENCOUNTER — APPOINTMENT (OUTPATIENT)
Dept: RADIATION ONCOLOGY | Facility: CLINIC | Age: 88
End: 2023-01-27
Payer: MEDICARE

## 2023-01-27 VITALS
SYSTOLIC BLOOD PRESSURE: 113 MMHG | BODY MASS INDEX: 28.56 KG/M2 | HEART RATE: 79 BPM | WEIGHT: 171.6 LBS | RESPIRATION RATE: 16 BRPM | TEMPERATURE: 99.1 F | DIASTOLIC BLOOD PRESSURE: 61 MMHG | OXYGEN SATURATION: 96 %

## 2023-01-27 PROCEDURE — 99024 POSTOP FOLLOW-UP VISIT: CPT

## 2023-01-27 NOTE — REVIEW OF SYSTEMS
[Joint Pain] : joint pain [Negative] : Genitourinary [Alopecia: Grade 0] : Alopecia: Grade 0 [Pruritus: Grade 0] : Pruritus: Grade 0 [Skin Atrophy: Grade 0] : Skin Atrophy: Grade 0 [Skin Hyperpigmentation: Grade 0] : Skin Hyperpigmentation: Grade 0 [Skin Induration: Grade 0] : Skin Induration: Grade 0 [Dermatitis Radiation: Grade 0] : Dermatitis Radiation: Grade 0 [FreeTextEntry9] : R knee  [de-identified] : dryness at R heel

## 2023-01-27 NOTE — DISEASE MANAGEMENT
[Clinical] : TNM Stage: c [N/A] : Currently not applicable [FreeTextEntry4] : Nodular Kaposi's sarcoma [TTNM] : . [NTNM] : . [MTNM] : . [de-identified] : 1200 [de-identified] : 1200 [de-identified] : Right medial heel

## 2023-01-27 NOTE — HISTORY OF PRESENT ILLNESS
[FreeTextEntry1] :  REQUIRED, DAUGHTER:PAO OH DOES NOT SPEAK ENGLISH EITHER.\par \par 1/27/23 PTE:\par : [id number]. Pt presents for PTE. Completed 1200cGy/3fx to R medial heel on 12/19/22. Today he reports feeling well overall. Denies presence of symptoms at the treatment site. On assessment, the R heel appears dry without inflammation. There are 2 - 3 darkened lesions noted on the R thigh and calf. Pt complaining of arthritic pain in R knee, but otherwise performing ADL as tolerated.\par \par 12/15/22 FINAL OTV\par Pt presents for OTV over telephone. Completed 800cGy/1200cGy to R medial heel. Notes feeling well overall. No complaints. PTE date confirmed with pt and understanding received. \par \par 11/23/22 RPA:\par Pt presents for follow-up.  # [id number]. Patient is here with his daughter. Skin over radiation treatment area on lateral aspect of knee and right inner foot  is clean, dry and intact. There are 3 hyperpigmented area to lateral aspect of knee at previously treated area. Also, there are 3 new lesions noted on medial aspect of right heel.  Denies any pain at treatment site, fatigue or difficulty moving his knee.On systemic therapy with Dr. Cottrell. Next appointment with  is on 12/01/2022. If the lesion on the heel turns out positive for Kaposi sarcoma, patient will be treated. patient and daughter made aware of the plan and consent signed.\par \par 05/09/2022: PTE \par Mr. 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 He tolerated his radiation well and did not develop any grade 3 or higher acute toxicities as a result of treatment. 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 He has completed 3D conformal 3Fx or 1200 cGy each to right lower leg and right inner foot from 03/20/2022 to 04/4/2022.\par \par \par 04/04/2022: FINAL OTV  # [id number]\par Patient is here accompanied by his daughter Pao.  has completed 3/3 Fx or 1200/1200 cGy to Kaposi's sarcoma of skin. No redness noted at treatment site.Encouraged to use Aquaphor to treatment site.  He notes a couple of nodules on the toes of the right foot, but he denies any pain or discomfort. \par \par \par 02/08/2022: Follow Up\par  # [id number]. Patient is here accompanied by his daughter Pao. The patient is known to us from a previous treatment for KS in 2020.  Patient has spots denoting Kaposi Sarcoma to right lateral knee and right inner foot. Patient states he has mild pain on right foot. CT-Sim and MRI (ordered by PCP) scheduled for next week. \par \par \par 6/10/2020: PTE\par Mr. hO returns for post treatment evaluation.  Overall is doing well, reporting that the discomfort he had at the right knee as resolved. He denies he had any issues with treatment. \par \par ________________________________________________\par ONCOLOGIC HISTORY (3/6/20)\par Mr. Jono Oh is an 88 year old male referred by Dr. 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 \par \par As per Dr. Larios's note, he presented with skin lesions mainly on right posterior knee and right foot that appeared around Summer 2019. Initially the lesions were observed, but when they enlarged, patient was sent for biopsy. Biopsy was done with PCP Dr. Robert Vale at [facility] and it was positive for Kaposi sarcoma (path report not available at this time).\par \par PET/ CT done 2/26/2020 at [facility] showed the following: \par -Right shoulder uptake, possibly related to bursitis or joint effusion. \par -Severe multilevel osteoarthritis. \par -No FDG avid region suggestive of neoplastic disease. \par -Colonic diverticulosis. \par -Enlarged prostate. \par \par He most recently followed up with Dr. Larios on 2/28/2020, at which time he was referred here for consideration of RT. As per patient's daughter, Dr. Larios preferred to hold off on systemic therapy due to patient's age, but may consider it in the future if RT was not successful.  He has not had any treatment thus far for the Kaposi sarcoma. \par \par Today, he reports he feels generally well with the exception of soreness in the shoulders and lower back pain that started a week ago. He notes the back pain sometimes interferes with his sleep. He states the lesions to his right posterior knee and foot are asymptomatic; denies pain or pruritus. Denies fever, chills, SOB, CP, NVDC, fatigue, or any further complaints. \par

## 2023-01-27 NOTE — PHYSICAL EXAM
[Normal] : the sclera and conjunctiva were normal, pupils were equal in size, round, reactive to light and extraocular movements were intact. [FreeTextEntry1] : patient has new lesions on his right middle this - large, flat lesion, inner right thigh - two lesions adjacent to each other, right inner calf - new lesion below previous tx field. All painless

## 2023-03-02 NOTE — VITALS
[80: Normal activity with effort; some signs or symptoms of disease.] : 80: Normal activity with effort; some signs or symptoms of disease.  [Negative] : Heme/Lymph

## 2023-03-30 ENCOUNTER — APPOINTMENT (OUTPATIENT)
Dept: NUCLEAR MEDICINE | Facility: HOSPITAL | Age: 88
End: 2023-03-30

## 2023-03-30 ENCOUNTER — OUTPATIENT (OUTPATIENT)
Dept: OUTPATIENT SERVICES | Facility: HOSPITAL | Age: 88
LOS: 1 days | End: 2023-03-30
Payer: MEDICARE

## 2023-03-30 LAB — GLUCOSE BLDC GLUCOMTR-MCNC: 100 MG/DL — HIGH (ref 70–99)

## 2023-03-30 PROCEDURE — 78816 PET IMAGE W/CT FULL BODY: CPT

## 2023-03-30 PROCEDURE — 82962 GLUCOSE BLOOD TEST: CPT

## 2023-03-30 PROCEDURE — 78816 PET IMAGE W/CT FULL BODY: CPT | Mod: 26

## 2023-03-30 PROCEDURE — A9552: CPT

## 2023-09-08 ENCOUNTER — APPOINTMENT (OUTPATIENT)
Dept: RADIATION ONCOLOGY | Facility: CLINIC | Age: 88
End: 2023-09-08
Payer: MEDICARE

## 2023-09-08 VITALS
DIASTOLIC BLOOD PRESSURE: 73 MMHG | RESPIRATION RATE: 16 BRPM | SYSTOLIC BLOOD PRESSURE: 113 MMHG | OXYGEN SATURATION: 98 % | TEMPERATURE: 97.8 F | BODY MASS INDEX: 27.49 KG/M2 | WEIGHT: 165 LBS | HEART RATE: 90 BPM | HEIGHT: 65 IN

## 2023-09-08 PROCEDURE — 99215 OFFICE O/P EST HI 40 MIN: CPT

## 2023-09-08 NOTE — VITALS
[Maximal Pain Intensity: 4/10] : 4/10 [NoTreatment Scheduled] : no treatment scheduled [90: Able to carry normal activity; minor signs or symptoms of disease.] : 90: Able to carry normal activity; minor signs or symptoms of disease.  [ECOG Performance Status: 1 - Restricted in physically strenuous activity but ambulatory and able to carry out work of a light or sedentary nature] : Performance Status: 1 - Restricted in physically strenuous activity but ambulatory and able to carry out work of a light or sedentary nature, e.g., light house work, office work

## 2023-09-08 NOTE — HISTORY OF PRESENT ILLNESS
[FreeTextEntry1] :  REQUIRED, DAUGHTER: PAO OH DOES NOT SPEAK ENGLISH EITHER.  9/8/23: RPA (: Carina [id number]) Patient presents for follow-up. Imaging completed after last appt (r/o recurrence) negative per EHR. Today the patient expressing concern over appearance of lesions along lateral arch on bilateral feet. The patient notes they appeared appx 3 weeks ago and are painful, itchy throughout the day. Endorses bilateral knee pain c/w history of arthritis. No additional complaints  1/27/23 PTE: : Juan [id number]. Pt presents for PTE. Completed 1200cGy/3fx to R medial heel on 12/19/22. Today he reports feeling well overall. Denies presence of symptoms at the treatment site. On assessment, the R heel appears dry without inflammation. There are 2 - 3 darkened lesions noted on the R thigh and calf. Pt complaining of arthritic pain in R knee, but otherwise performing ADL as tolerated.  12/15/22 FINAL OTV Pt presents for OTV over telephone. Completed 800cGy/1200cGy to R medial heel. Notes feeling well overall. No complaints. PTE date confirmed with pt and understanding received.   11/23/22 RPA: Pt presents for follow-up.  # [id number]. Patient is here with his daughter. Skin over radiation treatment area on lateral aspect of knee and right inner foot  is clean, dry and intact. There are 3 hyperpigmented area to lateral aspect of knee at previously treated area. Also, there are 3 new lesions noted on medial aspect of right heel.  Denies any pain at treatment site, fatigue or difficulty moving his knee.On systemic therapy with Dr. Cottrell. Next appointment with  is on 12/01/2022. If the lesion on the heel turns out positive for Kaposi sarcoma, patient will be treated. patient and daughter made aware of the plan and consent signed.  05/09/2022: PTE  Mr. 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 He tolerated his radiation well and did not develop any grade 3 or higher acute toxicities as a result of treatment.    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 He has completed 3D conformal 3Fx or 1200 cGy each to right lower leg and right inner foot from 03/20/2022 to 04/4/2022.   04/04/2022: FINAL OTV  # [id number] Patient is here accompanied by his daughter Pao.  has completed 3/3 Fx or 1200/1200 cGy to Kaposi's sarcoma of skin. No redness noted at treatment site.Encouraged to use Aquaphor to treatment site.  He notes a couple of nodules on the toes of the right foot, but he denies any pain or discomfort.    02/08/2022: Follow Up  # [id number]. Patient is here accompanied by his daughter Pao. The patient is known to us from a previous treatment for KS in 2020.  Patient has spots denoting Kaposi Sarcoma to right lateral knee and right inner foot. Patient states he has mild pain on right foot. CT-Sim and MRI (ordered by PCP) scheduled for next week.    6/10/2020: PTE Mr. Oh returns for post treatment evaluation.  Overall is doing well, reporting that the discomfort he had at the right knee as resolved. He denies he had any issues with treatment.   ________________________________________________ ONCOLOGIC HISTORY (3/6/20) Mr. Jono Oh is an 88 year old male referred by Dr. 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   As per Dr. Larios's note, he presented with skin lesions mainly on right posterior knee and right foot that appeared around Summer 2019. Initially the lesions were observed, but when they enlarged, patient was sent for biopsy. Biopsy was done with PCP Dr. oRbert Vale at [facility] and it was positive for Kaposi sarcoma (path report not available at this time).  PET/ CT done 2/26/2020 at [facility] showed the following:  -Right shoulder uptake, possibly related to bursitis or joint effusion.  -Severe multilevel osteoarthritis.  -No FDG avid region suggestive of neoplastic disease.  -Colonic diverticulosis.  -Enlarged prostate.   He most recently followed up with Dr. Larios on 2/28/2020, at which time he was referred here for consideration of RT. As per patient's daughter, Dr. aLrios preferred to hold off on systemic therapy due to patient's age, but may consider it in the future if RT was not successful.  He has not had any treatment thus far for the Kaposi sarcoma.   Today, he reports he feels generally well with the exception of soreness in the shoulders and lower back pain that started a week ago. He notes the back pain sometimes interferes with his sleep. He states the lesions to his right posterior knee and foot are asymptomatic; denies pain or pruritus. Denies fever, chills, SOB, CP, NVDC, fatigue, or any further complaints.

## 2023-09-08 NOTE — REASON FOR VISIT
[Routine Follow-Up] : routine follow-up visit for [Other: ___] : [unfilled] [Family Member] : family member [[employer] ] : provided by [employer]   [Interpreters_IDNumber] : [id number] [Interpreters_FullName] : Carina  [TWNoteComboBox1] : Japanese

## 2023-09-08 NOTE — REVIEW OF SYSTEMS
[Joint Pain] : joint pain [Negative] : Heme/Lymph [de-identified] : lesions present on lateral arch of bilateral feet

## 2023-09-08 NOTE — DISEASE MANAGEMENT
[Clinical] : TNM Stage: c [N/A] : Currently not applicable [FreeTextEntry4] : Nodular Kaposi's sarcoma [TTNM] : . [NTNM] : . [MTNM] : . [de-identified] : 1200 [de-identified] : 1200 [de-identified] : Right medial heel

## 2023-12-12 ENCOUNTER — NON-APPOINTMENT (OUTPATIENT)
Age: 88
End: 2023-12-12

## 2023-12-13 ENCOUNTER — NON-APPOINTMENT (OUTPATIENT)
Age: 88
End: 2023-12-13

## 2023-12-13 VITALS
DIASTOLIC BLOOD PRESSURE: 77 MMHG | RESPIRATION RATE: 16 BRPM | HEART RATE: 83 BPM | SYSTOLIC BLOOD PRESSURE: 130 MMHG | BODY MASS INDEX: 26.66 KG/M2 | TEMPERATURE: 98.3 F | HEIGHT: 65 IN | OXYGEN SATURATION: 98 % | WEIGHT: 160 LBS

## 2023-12-18 ENCOUNTER — NON-APPOINTMENT (OUTPATIENT)
Age: 88
End: 2023-12-18

## 2023-12-20 NOTE — VITALS
[90: Minor restrictions in physically strenous activity.] : 90: Minor restrictions in physically strenuous activity. [Maximal Pain Intensity: 0/10] : 0/10 [Least Pain Intensity: 0/10] : 0/10 [90: Able to carry normal activity; minor signs or symptoms of disease.] : 90: Able to carry normal activity; minor signs or symptoms of disease.  [ECOG Performance Status: 1 - Restricted in physically strenuous activity but ambulatory and able to carry out work of a light or sedentary nature] : Performance Status: 1 - Restricted in physically strenuous activity but ambulatory and able to carry out work of a light or sedentary nature, e.g., light house work, office work

## 2023-12-20 NOTE — HISTORY OF PRESENT ILLNESS
[FreeTextEntry1] :  REQUIRED, DAUGHTER: PAO OH DOES NOT SPEAK ENGLISH EITHER.  12/13/23: OTV/Final OTV 400cGy/1200cGy, 1/3fx to Left foot. Today patient states he is feeling very good. He denies pain, fever, chills, skin irritation. PTE appointment reviewed with patient. Patient to continue radiation as planned. Croatian telephonic  utilized to facilitate communication.   9/8/23: RPA (: Carina [id number]) Patient presents for follow-up. Imaging completed after last appt (r/o recurrence) negative per EHR. Today the patient expressing concern over appearance of lesions along lateral arch on bilateral feet. The patient notes they appeared appx 3 weeks ago and are painful, itchy throughout the day. Endorses bilateral knee pain c/w history of arthritis. No additional complaints  1/27/23 PTE: : Juan [id number]. Pt presents for PTE. Completed 1200cGy/3fx to R medial heel on 12/19/22. Today he reports feeling well overall. Denies presence of symptoms at the treatment site. On assessment, the R heel appears dry without inflammation. There are 2 - 3 darkened lesions noted on the R thigh and calf. Pt complaining of arthritic pain in R knee, but otherwise performing ADL as tolerated.  12/15/22 FINAL OTV Pt presents for OTV over telephone. Completed 800cGy/1200cGy to R medial heel. Notes feeling well overall. No complaints. PTE date confirmed with pt and understanding received.   11/23/22 RPA: Pt presents for follow-up.  # [id number]. Patient is here with his daughter. Skin over radiation treatment area on lateral aspect of knee and right inner foot  is clean, dry and intact. There are 3 hyperpigmented area to lateral aspect of knee at previously treated area. Also, there are 3 new lesions noted on medial aspect of right heel.  Denies any pain at treatment site, fatigue or difficulty moving his knee.On systemic therapy with Dr. Cottrell. Next appointment with  is on 12/01/2022. If the lesion on the heel turns out positive for Kaposi sarcoma, patient will be treated. patient and daughter made aware of the plan and consent signed.  05/09/2022: PTE  Mr. 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 He tolerated his radiation well and did not develop any grade 3 or higher acute toxicities as a result of treatment.    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 He has completed 3D conformal 3Fx or 1200 cGy each to right lower leg and right inner foot from 03/20/2022 to 04/4/2022.   04/04/2022: FINAL OTV  # [id number] Patient is here accompanied by his daughter Pao.  has completed 3/3 Fx or 1200/1200 cGy to Kaposi's sarcoma of skin. No redness noted at treatment site.Encouraged to use Aquaphor to treatment site.  He notes a couple of nodules on the toes of the right foot, but he denies any pain or discomfort.    02/08/2022: Follow Up  # [id number]. Patient is here accompanied by his daughter Pao. The patient is known to us from a previous treatment for KS in 2020.  Patient has spots denoting Kaposi Sarcoma to right lateral knee and right inner foot. Patient states he has mild pain on right foot. CT-Sim and MRI (ordered by PCP) scheduled for next week.    6/10/2020: PTE Mr. Oh returns for post treatment evaluation.  Overall is doing well, reporting that the discomfort he had at the right knee as resolved. He denies he had any issues with treatment.   ________________________________________________ ONCOLOGIC HISTORY (3/6/20) Mr. Jono Oh is an 88 year old male referred by Dr. 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   As per Dr. Larios's note, he presented with skin lesions mainly on right posterior knee and right foot that appeared around Summer 2019. Initially the lesions were observed, but when they enlarged, patient was sent for biopsy. Biopsy was done with PCP Dr. Robert Vale at [facility] and it was positive for Kaposi sarcoma (path report not available at this time).  PET/ CT done 2/26/2020 at [facility] showed the following:  -Right shoulder uptake, possibly related to bursitis or joint effusion.  -Severe multilevel osteoarthritis.  -No FDG avid region suggestive of neoplastic disease.  -Colonic diverticulosis.  -Enlarged prostate.   He most recently followed up with Dr. Larios on 2/28/2020, at which time he was referred here for consideration of RT. As per patient's daughter, Dr. Larios preferred to hold off on systemic therapy due to patient's age, but may consider it in the future if RT was not successful.  He has not had any treatment thus far for the Kaposi sarcoma.   Today, he reports he feels generally well with the exception of soreness in the shoulders and lower back pain that started a week ago. He notes the back pain sometimes interferes with his sleep. He states the lesions to his right posterior knee and foot are asymptomatic; denies pain or pruritus. Denies fever, chills, SOB, CP, NVDC, fatigue, or any further complaints.

## 2023-12-20 NOTE — DISEASE MANAGEMENT
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 [NTNM] : . [MTNM] : . [de-identified] : 400cGy [de-identified] : 1200cGy [de-identified] : Left foot 400cGy/1200cGy, 1/3fx  400cGy/1200cGy, 1/3fx  400cGy/1200cGy, 1/3fx  400cGy/1200cGy, 1/3fx

## 2024-02-06 ENCOUNTER — APPOINTMENT (OUTPATIENT)
Dept: RADIATION ONCOLOGY | Facility: CLINIC | Age: 89
End: 2024-02-06
Payer: MEDICARE

## 2024-02-06 VITALS
TEMPERATURE: 98.7 F | OXYGEN SATURATION: 98 % | BODY MASS INDEX: 30.09 KG/M2 | HEART RATE: 93 BPM | SYSTOLIC BLOOD PRESSURE: 150 MMHG | DIASTOLIC BLOOD PRESSURE: 73 MMHG | WEIGHT: 180.8 LBS

## 2024-02-06 PROCEDURE — 99024 POSTOP FOLLOW-UP VISIT: CPT

## 2024-02-11 NOTE — PHYSICAL EXAM
[de-identified] : Resolution of verrucous nodules over medial aspect of L posterior foot, heel and ankle. Mild hyperpigmentation. No palpable fibrosis. Pain at bilateral knee joint, chronic

## 2024-02-11 NOTE — DISEASE MANAGEMENT
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 [NTNM] : . [MTNM] : . [de-identified] : 1200cGy [de-identified] : 1200cGy [de-identified] : Left foot 400cGy/1200cGy, 1/3fx  400cGy/1200cGy, 1/3fx  400cGy/1200cGy, 1/3fx  400cGy/1200cGy, 1/3fx

## 2024-02-11 NOTE — REASON FOR VISIT
[Time Spent: ____ minutes] : Total time spent using  services: [unfilled] minutes. The patient's primary language is not English thus required  services. [Interpreters_IDNumber] : [id number] [Interpreters_FullName] : Vincenzo [TWNoteComboBox1] : Mauritanian

## 2024-02-11 NOTE — VITALS
[Maximal Pain Intensity: 4/10] : 4/10 [OTC] : OTC [Least Pain Intensity: 0/10] : 0/10 [80: Normal activity with effort; some signs or symptoms of disease.] : 80: Normal activity with effort; some signs or symptoms of disease.  [ECOG Performance Status: 2 - Ambulatory and capable of all self care but unable to carry out any work activities] : Performance Status: 2 - Ambulatory and capable of all self care but unable to carry out any work activities. Up and about more than 50% of waking hours

## 2024-02-11 NOTE — REVIEW OF SYSTEMS
[Lower Ext Edema] : lower extremity edema [Negative] : Neurological [Edema Limbs: Grade 1-  5 - 10% inter-limb discrepancy in volume or circumference at point of greatest visible difference; swelling or obscuration of anatomic architecture on close inspection] : Edema Limbs: Grade 1-  5 - 10% inter-limb discrepancy in volume or circumference at point of greatest visible difference; swelling or obscuration of anatomic architecture on close inspection [Skin Hyperpigmentation: Grade 0] : Skin Hyperpigmentation: Grade 0 [FreeTextEntry5] : mild ankle edema [FreeTextEntry1] : trace edema ankles

## 2024-02-11 NOTE — HISTORY OF PRESENT ILLNESS
[FreeTextEntry1] :  REQUIRED, DAUGHTER: PAO OH DOES NOT SPEAK ENGLISH EITHER.  12/19/23: Patient completed 1200cGy to Left foot.  2/6/24: PTE Patient presents for post treatment evaluation accompanied by daughter. He reports feeling well overall, denies fatigue. Appetite is good. He denies pain in Left foot, reports pain in b/l knees. Skin is intact.   12/13/23: OTV/Final OTV 400cGy/1200cGy, 1/3fx to Left foot. Today patient states he is feeling very good. He denies pain, fever, chills, skin irritation. PTE appointment reviewed with patient. Patient to continue radiation as planned. Swedish telephonic  utilized to facilitate communication.   9/8/23: RPA (: Carina [id number]) Patient presents for follow-up. Imaging completed after last appt (r/o recurrence) negative per EHR. Today the patient expressing concern over appearance of lesions along lateral arch on bilateral feet. The patient notes they appeared appx 3 weeks ago and are painful, itchy throughout the day. Endorses bilateral knee pain c/w history of arthritis. No additional complaints  1/27/23 PTE: : Juan [id number]. Pt presents for PTE. Completed 1200cGy/3fx to R medial heel on 12/19/22. Today he reports feeling well overall. Denies presence of symptoms at the treatment site. On assessment, the R heel appears dry without inflammation. There are 2 - 3 darkened lesions noted on the R thigh and calf. Pt complaining of arthritic pain in R knee, but otherwise performing ADL as tolerated.  12/15/22 FINAL OTV Pt presents for OTV over telephone. Completed 800cGy/1200cGy to R medial heel. Notes feeling well overall. No complaints. PTE date confirmed with pt and understanding received.   11/23/22 RPA: Pt presents for follow-up.  # [id number]. Patient is here with his daughter. Skin over radiation treatment area on lateral aspect of knee and right inner foot  is clean, dry and intact. There are 3 hyperpigmented area to lateral aspect of knee at previously treated area. Also, there are 3 new lesions noted on medial aspect of right heel.  Denies any pain at treatment site, fatigue or difficulty moving his knee.On systemic therapy with Dr. Cottrell. Next appointment with  is on 12/01/2022. If the lesion on the heel turns out positive for Kaposi sarcoma, patient will be treated. patient and daughter made aware of the plan and consent signed.  05/09/2022: PTE  Mr. 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 He tolerated his radiation well and did not develop any grade 3 or higher acute toxicities as a result of treatment.    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 He has completed 3D conformal 3Fx or 1200 cGy each to right lower leg and right inner foot from 03/20/2022 to 04/4/2022.   04/04/2022: FINAL OTV  # [id number] Patient is here accompanied by his daughter Pao.  has completed 3/3 Fx or 1200/1200 cGy to Kaposi's sarcoma of skin. No redness noted at treatment site.Encouraged to use Aquaphor to treatment site.  He notes a couple of nodules on the toes of the right foot, but he denies any pain or discomfort.    02/08/2022: Follow Up  # [id number]. Patient is here accompanied by his daughter Pao. The patient is known to us from a previous treatment for KS in 2020.  Patient has spots denoting Kaposi Sarcoma to right lateral knee and right inner foot. Patient states he has mild pain on right foot. CT-Sim and MRI (ordered by PCP) scheduled for next week.    6/10/2020: PTE Mr. Oh returns for post treatment evaluation.  Overall is doing well, reporting that the discomfort he had at the right knee as resolved. He denies he had any issues with treatment.   ________________________________________________ ONCOLOGIC HISTORY (3/6/20) Mr. Jono Oh is an 88 year old male referred by Dr. 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   As per Dr. Larios's note, he presented with skin lesions mainly on right posterior knee and right foot that appeared around Summer 2019. Initially the lesions were observed, but when they enlarged, patient was sent for biopsy. Biopsy was done with PCP Dr. Robert Vale at [facility] and it was positive for Kaposi sarcoma (path report not available at this time).  PET/ CT done 2/26/2020 at [facility] showed the following:  -Right shoulder uptake, possibly related to bursitis or joint effusion.  -Severe multilevel osteoarthritis.  -No FDG avid region suggestive of neoplastic disease.  -Colonic diverticulosis.  -Enlarged prostate.   He most recently followed up with Dr. Larios on 2/28/2020, at which time he was referred here for consideration of RT. As per patient's daughter, Dr. Larios preferred to hold off on systemic therapy due to patient's age, but may consider it in the future if RT was not successful.  He has not had any treatment thus far for the Kaposi sarcoma.   Today, he reports he feels generally well with the exception of soreness in the shoulders and lower back pain that started a week ago. He notes the back pain sometimes interferes with his sleep. He states the lesions to his right posterior knee and foot are asymptomatic; denies pain or pruritus. Denies fever, chills, SOB, CP, NVDC, fatigue, or any further complaints.